# Patient Record
Sex: FEMALE | Race: WHITE | NOT HISPANIC OR LATINO | Employment: PART TIME | URBAN - METROPOLITAN AREA
[De-identification: names, ages, dates, MRNs, and addresses within clinical notes are randomized per-mention and may not be internally consistent; named-entity substitution may affect disease eponyms.]

---

## 2017-01-27 ENCOUNTER — RESULTS ENCOUNTER (OUTPATIENT)
Dept: FAMILY MEDICINE CLINIC | Facility: CLINIC | Age: 52
End: 2017-01-27

## 2017-01-27 DIAGNOSIS — E78.00 HYPERCHOLESTEROLEMIA: ICD-10-CM

## 2017-01-27 DIAGNOSIS — E55.9 VITAMIN D DEFICIENCY: ICD-10-CM

## 2017-02-22 ENCOUNTER — OFFICE VISIT (OUTPATIENT)
Dept: FAMILY MEDICINE CLINIC | Facility: CLINIC | Age: 52
End: 2017-02-22

## 2017-02-22 VITALS
SYSTOLIC BLOOD PRESSURE: 110 MMHG | BODY MASS INDEX: 21.71 KG/M2 | HEART RATE: 92 BPM | DIASTOLIC BLOOD PRESSURE: 70 MMHG | HEIGHT: 63 IN | WEIGHT: 122.5 LBS | OXYGEN SATURATION: 98 % | TEMPERATURE: 98 F

## 2017-02-22 DIAGNOSIS — J01.10 ACUTE NON-RECURRENT FRONTAL SINUSITIS: Primary | ICD-10-CM

## 2017-02-22 LAB
EXPIRATION DATE: NORMAL
INTERNAL CONTROL: NORMAL
Lab: NORMAL
S PYO AG THROAT QL: NEGATIVE

## 2017-02-22 PROCEDURE — 99213 OFFICE O/P EST LOW 20 MIN: CPT | Performed by: FAMILY MEDICINE

## 2017-02-22 PROCEDURE — 87880 STREP A ASSAY W/OPTIC: CPT | Performed by: FAMILY MEDICINE

## 2017-02-22 RX ORDER — AMOXICILLIN 500 MG/1
1000 CAPSULE ORAL 3 TIMES DAILY
Qty: 60 CAPSULE | Refills: 0 | Status: SHIPPED | OUTPATIENT
Start: 2017-02-22

## 2017-02-22 NOTE — PROGRESS NOTES
Subjective   Lindsay Fitzpatrick is a 51 y.o. female.   Chief Complaint   Patient presents with   • Sore Throat   • Headache   • Nasal Congestion       History of Present Illness     #1 sore throat on and off for one month.  Current episode started 4 days ago.  Patient also noticed  white spots on the right side of her throat and some on the left, she has a dry cough and chills for 4 days, subjective fever.  She has sinus pressure and thick, green nasal discharge.  She tried Claritin-D, Nasonex, Aleve.  It helped some, but overall she is getting worse.  No other symptoms associated.    The following portions of the patient's history were reviewed and updated as appropriate: allergies, current medications, past medical history and problem list.    Review of Systems   Constitutional: Positive for chills and fever.   HENT: Positive for congestion, rhinorrhea and sinus pressure.    Respiratory: Positive for cough.          Objective   Wt Readings from Last 3 Encounters:   02/22/17 122 lb 8 oz (55.6 kg)   07/27/16 123 lb (55.8 kg)   01/15/16 124 lb 0.1 oz (56.3 kg)      Vitals:    02/22/17 1117   BP: 110/70   Pulse: 92   Temp: 98 °F (36.7 °C)   SpO2: 98%     Temp Readings from Last 3 Encounters:   02/22/17 98 °F (36.7 °C)   07/27/16 98.4 °F (36.9 °C)   01/15/16 98.2 °F (36.8 °C)     BP Readings from Last 3 Encounters:   02/22/17 110/70   07/27/16 110/70   01/15/16 100/60     Pulse Readings from Last 3 Encounters:   02/22/17 92   07/27/16 97   01/15/16 100       Physical Exam   Constitutional: She is oriented to person, place, and time. She appears well-developed and well-nourished.   HENT:   Head: Normocephalic and atraumatic.   Right Ear: Tympanic membrane, external ear and ear canal normal.   Left Ear: Tympanic membrane, external ear and ear canal normal.   Nose: Mucosal edema and rhinorrhea present.   Mouth/Throat: Mucous membranes are normal. No oropharyngeal exudate, posterior oropharyngeal edema or posterior  oropharyngeal erythema. No tonsillar exudate.   Neck: Neck supple. Carotid bruit is not present.   Cardiovascular: Normal rate, regular rhythm and normal heart sounds.    Pulmonary/Chest: Effort normal and breath sounds normal.   Lymphadenopathy:     She has no cervical adenopathy.   Neurological: She is alert and oriented to person, place, and time.   Skin: Skin is warm, dry and intact.       Assessment/Plan   Lindsay was seen today for sore throat, headache and nasal congestion.    Diagnoses and all orders for this visit:    Acute non-recurrent frontal sinusitis    Other orders  -     amoxicillin (AMOXIL) 500 MG capsule; Take 2 capsules by mouth 3 (Three) Times a Day.        #1 acute sinusitis-will treat with amoxicillin 1 g 3 times a day for 10 days.  Mucinex 1200 mg plus fluids.  Strep test negative.

## 2018-04-04 ENCOUNTER — TRANSCRIBE ORDERS (OUTPATIENT)
Dept: ADMINISTRATIVE | Facility: HOSPITAL | Age: 53
End: 2018-04-04

## 2018-04-04 DIAGNOSIS — Z13.820 ENCOUNTER FOR SCREENING FOR OSTEOPOROSIS: Primary | ICD-10-CM

## 2018-04-11 ENCOUNTER — HOSPITAL ENCOUNTER (OUTPATIENT)
Dept: BONE DENSITY | Facility: HOSPITAL | Age: 53
Discharge: HOME | End: 2018-04-11
Attending: OBSTETRICS & GYNECOLOGY | Admitting: OBSTETRICS & GYNECOLOGY

## 2018-04-11 DIAGNOSIS — Z13.820 ENCOUNTER FOR SCREENING FOR OSTEOPOROSIS: ICD-10-CM

## 2018-04-11 PROCEDURE — 77080 DXA BONE DENSITY AXIAL: CPT

## 2023-06-15 ENCOUNTER — HOSPITAL ENCOUNTER (OUTPATIENT)
Dept: PET IMAGING | Facility: HOSPITAL | Age: 58
Discharge: HOME | End: 2023-06-15
Payer: COMMERCIAL

## 2023-06-15 ENCOUNTER — TRANSCRIBE ORDERS (OUTPATIENT)
Dept: WOMENS IMAGING | Facility: HOSPITAL | Age: 58
End: 2023-06-15
Payer: COMMERCIAL

## 2023-06-15 DIAGNOSIS — M81.0 HIGH RISK FOR FRACTURE DUE TO OSTEOPOROSIS BY DEXA SCAN: Primary | ICD-10-CM

## 2023-06-15 PROCEDURE — 77080 DXA BONE DENSITY AXIAL: CPT | Performed by: RADIOLOGY

## 2023-06-15 PROCEDURE — 77080 DXA BONE DENSITY AXIAL: CPT

## 2025-03-18 ENCOUNTER — OFFICE VISIT (OUTPATIENT)
Dept: CARDIOLOGY | Facility: CLINIC | Age: 60
End: 2025-03-18
Payer: COMMERCIAL

## 2025-03-18 VITALS
HEIGHT: 63 IN | HEART RATE: 82 BPM | WEIGHT: 131.8 LBS | BODY MASS INDEX: 23.35 KG/M2 | SYSTOLIC BLOOD PRESSURE: 100 MMHG | OXYGEN SATURATION: 98 % | DIASTOLIC BLOOD PRESSURE: 66 MMHG

## 2025-03-18 DIAGNOSIS — R00.2 PALPITATIONS: ICD-10-CM

## 2025-03-18 DIAGNOSIS — E78.49 FAMILIAL HYPERLIPIDEMIA, HIGH LDL: Primary | ICD-10-CM

## 2025-03-18 DIAGNOSIS — R06.09 DYSPNEA ON EXERTION: ICD-10-CM

## 2025-03-18 DIAGNOSIS — R42 LIGHTHEADED: ICD-10-CM

## 2025-03-18 DIAGNOSIS — R22.42 LOCALIZED SWELLING OF LEFT LOWER EXTREMITY: ICD-10-CM

## 2025-03-18 PROCEDURE — 99204 OFFICE O/P NEW MOD 45 MIN: CPT | Performed by: STUDENT IN AN ORGANIZED HEALTH CARE EDUCATION/TRAINING PROGRAM

## 2025-03-18 PROCEDURE — 93000 ELECTROCARDIOGRAM COMPLETE: CPT | Performed by: STUDENT IN AN ORGANIZED HEALTH CARE EDUCATION/TRAINING PROGRAM

## 2025-03-18 RX ORDER — BUDESONIDE AND FORMOTEROL FUMARATE DIHYDRATE 160; 4.5 UG/1; UG/1
2 AEROSOL RESPIRATORY (INHALATION)
COMMUNITY
Start: 2025-03-06 | End: 2025-09-02

## 2025-03-18 RX ORDER — METOPROLOL TARTRATE 25 MG/1
200 TABLET, FILM COATED ORAL ONCE
OUTPATIENT
Start: 2025-03-18 | End: 2025-03-18

## 2025-03-18 RX ORDER — METOPROLOL TARTRATE 25 MG/1
100 TABLET, FILM COATED ORAL ONCE
OUTPATIENT
Start: 2025-03-18

## 2025-03-18 RX ORDER — METOPROLOL TARTRATE 25 MG/1
TABLET, FILM COATED ORAL
Qty: 2 TABLET | Refills: 0 | Status: SHIPPED | OUTPATIENT
Start: 2025-03-18

## 2025-03-18 RX ORDER — METOPROLOL TARTRATE 25 MG/1
150 TABLET, FILM COATED ORAL ONCE
OUTPATIENT
Start: 2025-03-18

## 2025-03-18 RX ORDER — METOPROLOL TARTRATE 25 MG/1
50 TABLET, FILM COATED ORAL ONCE
OUTPATIENT
Start: 2025-03-18

## 2025-03-18 RX ORDER — SODIUM CHLORIDE 9 MG/ML
40 INJECTION, SOLUTION INTRAVENOUS AS NEEDED
OUTPATIENT
Start: 2025-03-18

## 2025-03-18 RX ORDER — EVOLOCUMAB 140 MG/ML
140 INJECTION, SOLUTION SUBCUTANEOUS
COMMUNITY
Start: 2025-02-06 | End: 2025-08-05

## 2025-03-18 RX ORDER — ESTRADIOL 0.1 MG/D
1 PATCH TRANSDERMAL WEEKLY
COMMUNITY

## 2025-03-18 RX ORDER — METOPROLOL TARTRATE 50 MG
50 TABLET ORAL
OUTPATIENT
Start: 2025-03-18

## 2025-03-18 RX ORDER — METOPROLOL TARTRATE 25 MG/1
TABLET, FILM COATED ORAL
Qty: 2 TABLET | Refills: 0 | Status: SHIPPED | OUTPATIENT
Start: 2025-03-18 | End: 2025-03-18

## 2025-03-18 RX ORDER — NITROGLYCERIN 0.4 MG/1
0.4 TABLET SUBLINGUAL
OUTPATIENT
Start: 2025-03-18 | End: 2025-03-18

## 2025-03-18 RX ORDER — METOPROLOL TARTRATE 1 MG/ML
5 INJECTION, SOLUTION INTRAVENOUS
OUTPATIENT
Start: 2025-03-18

## 2025-03-18 RX ORDER — SODIUM CHLORIDE 0.9 % (FLUSH) 0.9 %
10 SYRINGE (ML) INJECTION EVERY 12 HOURS SCHEDULED
OUTPATIENT
Start: 2025-03-18

## 2025-03-18 RX ORDER — IVABRADINE 5 MG/1
15 TABLET, FILM COATED ORAL ONCE
OUTPATIENT
Start: 2025-03-18 | End: 2025-03-18

## 2025-03-18 RX ORDER — NITROGLYCERIN 0.4 MG/1
0.8 TABLET SUBLINGUAL
OUTPATIENT
Start: 2025-03-18

## 2025-03-18 RX ORDER — LORATADINE 10 MG/1
20 TABLET ORAL DAILY
COMMUNITY

## 2025-03-18 RX ORDER — SODIUM CHLORIDE 0.9 % (FLUSH) 0.9 %
10 SYRINGE (ML) INJECTION AS NEEDED
OUTPATIENT
Start: 2025-03-18

## 2025-03-18 RX ORDER — CYCLOSPORINE 0.5 MG/ML
1 EMULSION OPHTHALMIC EVERY 12 HOURS
COMMUNITY
Start: 2024-12-12

## 2025-03-18 RX ORDER — ALBUTEROL SULFATE 90 UG/1
2 INHALANT RESPIRATORY (INHALATION) EVERY 6 HOURS PRN
COMMUNITY
Start: 2025-03-06

## 2025-03-18 RX ORDER — TRAZODONE HYDROCHLORIDE 50 MG/1
25 TABLET ORAL
COMMUNITY
Start: 2025-02-13

## 2025-03-18 NOTE — PATIENT INSTRUCTIONS
To investigate your shortness of breath, and rule out a cardiac cause, were going to obtain a CT scan of your heart.  You are going to take the metoprolol tablet the night before and the morning of the scan.  Make sure that you increase your hydration including increasing water intake and Gatorade's the night before and the morning of the scan.  This will help increase your blood pressure.

## 2025-03-18 NOTE — PROGRESS NOTES
[facility]    Subjective:     Encounter Date:03/18/25      Patient ID: Lindsay Fitzpatrick is a 59 y.o. female.    Chief Complaint:   Chief Complaint   Patient presents with    Establish Care     Patient is in the office today to establish care for mixed hyperlipidemia.       History of Present Illness    Lindsay Fitzpatrick is a 59 y.o. lady who presents for further evaluation.    She has a past medical history of tobacco use and quit smoking 7 years ago.  She has had intermittent dyspnea complaints that dates back with these paroxysms of dyspnea to 2018.  She saw cardiologist at that time and a stress echocardiogram was unremarkable.  She has had intermittent complaints of dizziness, left leg swelling, and ringing in her right ear with a whooshing sound and some dizziness when she reaches her arms above her head, and the symptoms have a concern that she presents today for cardiac evaluation.    Her PCP has ordered a carotid duplex and lower extremity ultrasounds and Dopplers which are pending.  She has a history of venous insufficiency in the past requiring vein stripping.    Currently, she continues to have these intermittent dyspnea complaints, which she describes as paroxysms of dyspnea sometimes walking up a flight of stairs, but also intermittently at rest.  She will have some heaviness in her chest when it occurs.  She also has an intermittent palpitation complaint where she feels a prominent heartbeat, but this seems like a separate issue when compared to the intermittent dizziness and dyspnea.    Previous Cardiac Testing:  Holter monitor, February 2025: 48 hours.  Patient triggered events all correlate with sinus rhythm or sinus tachycardia. Sinus rhythm throughout with occasional PVCs at a burden of 0.3%.  No atrial fibrillation or sustained arrhythmias.  No heart block or significant pauses.  All symptoms corresponded to sinus rhythm and sinus tachycardia.     Echo February 2025:  Normal  "LV structure and systolic function.                            The LVEF is 59%.                            No significant valvular abnormalities.                            Normal RV structure and systolic function.                            Estimated normal left- and right-sided filling pressures.   Findings    Lung cancer screening CT February 2025: Aortic calcifications but no evidence of coronary artery calcium.    The following portions of the patient's history were reviewed and updated as appropriate: allergies, current medications, past family history, past medical history, past social history, past surgical history and problem list.    Past Medical History:   Diagnosis Date    Anemia     Asthma     Breast cyst     Depression     Endometriosis     H/O blood clots     Hyperlipidemia     Lupus        Past Surgical History:   Procedure Laterality Date    BREAST CYST EXCISION      HYSTERECTOMY             ECG 12 Lead    Date/Time: 3/18/2025 10:31 AM  Performed by: Shawn Roberts MD    Authorized by: Shawn Roberts MD  Comparison: not compared with previous ECG   Previous ECG: no previous ECG available  Rhythm: sinus rhythm  Rate: normal  Conduction: conduction normal  ST Segments: ST segments normal  T Waves: T waves normal  QRS axis: normal  Other findings: right atrial abnormality    Clinical impression: non-specific ECG  Comments: Borderline right atrial enlargement otherwise normal ECG.             Objective:     Vitals:    03/18/25 0838   BP: 100/66   BP Location: Left arm   Patient Position: Sitting   Cuff Size: Adult   Pulse: 82   SpO2: 98%   Weight: 59.8 kg (131 lb 12.8 oz)   Height: 160 cm (63\")         Constitutional:       Appearance: Healthy appearance. Not in distress.   Neck:      Vascular: JVD normal.   Pulmonary:      Effort: Pulmonary effort is normal.      Breath sounds: Normal breath sounds.   Cardiovascular:      PMI at left midclavicular line. Normal rate. Regular rhythm. Normal S2.       " Murmurs: There is no murmur.      Comments: No significant carotid bruits.  Pulses:     Intact distal pulses.   Edema:     Peripheral edema absent.   Skin:     General: Skin is warm and dry.   Neurological:      General: No focal deficit present.      Mental Status: Alert, oriented to person, place, and time and oriented to person, place and time.   Psychiatric:         Mood and Affect: Mood and affect normal.         Lab Review:       BUN   Date Value Ref Range Status   01/17/2023 13 10 - 20 mg/dL Final     Creatinine   Date Value Ref Range Status   01/17/2023 0.63 0.55 - 1.02 mg/dL Final     Potassium   Date Value Ref Range Status   01/17/2023 5.3 (H) 3.5 - 5.1 mmol/L Final     ALT (SGPT)   Date Value Ref Range Status   01/17/2023 15 10 - 35 U/L Final     AST (SGOT)   Date Value Ref Range Status   01/17/2023 17 10 - 35 U/L Final         Performed        Assessment:          Diagnosis Plan   1. Familial hyperlipidemia, high LDL  CT Angiogram Coronary    CT Angiogram Coronary-Cardiology Interpretation    metoprolol tartrate (LOPRESSOR) tablet 200 mg    metoprolol tartrate (LOPRESSOR) tablet 150 mg    metoprolol tartrate (LOPRESSOR) tablet 100 mg    metoprolol tartrate (LOPRESSOR) tablet 50 mg    metoprolol tartrate (LOPRESSOR) tablet 50 mg    metoprolol tartrate (LOPRESSOR) injection 5 mg    nitroglycerin (NITROSTAT) SL tablet 0.4 mg    nitroglycerin (NITROSTAT) SL tablet 0.8 mg    ivabradine HCl (CORLANOR) tablet 15 mg    No Caffeine or Nicotine 4 Hours Prior to CTA Appointment    Nothing to Eat or Drink 4 Hours Prior to CTA Appointment    Do Not Take Phosphodiasterase Inhibitors in the 72 Hours Prior to Coronary CTA    Obtain Informed Consent - Computed Tomography Angiography of Chest - Angiogram of Coronary Arteries    Vital Signs Upon Arrival    Cardiac Monitoring    Verify NPO Status - Patient to Be NPO at Least 4 Hours Prior to CTA    Notify CT After Administration of metoprolol tartrate (LOPRESSOR) tablet     Notify Provider If Total Metoprolol Given Equals 300mg & Heart Rate Not At Goal    Notify Provider Prior to Administration of Nitroglycerin if Patient SBP <80    POC Creatinine    Insert Peripheral IV    Saline Lock & Maintain IV Access    sodium chloride 0.9 % flush 10 mL    sodium chloride 0.9 % flush 10 mL    sodium chloride 0.9 % infusion 40 mL    Vital Signs - See Instructions    Hold Medication Metformin (Glucophage, Glucophage XR, Fortament, Glumetza);  Metglip (metformin/glipizide);  Glucovance (metformin/glyburide); Avandamet (metformin/rosiglitazone)    Patient May Discharge Home After Procedure Complete (If Stable)    metoprolol tartrate (LOPRESSOR) 25 MG tablet      2. Dyspnea on exertion  CT Angiogram Coronary    CT Angiogram Coronary-Cardiology Interpretation    metoprolol tartrate (LOPRESSOR) tablet 200 mg    metoprolol tartrate (LOPRESSOR) tablet 150 mg    metoprolol tartrate (LOPRESSOR) tablet 100 mg    metoprolol tartrate (LOPRESSOR) tablet 50 mg    metoprolol tartrate (LOPRESSOR) tablet 50 mg    metoprolol tartrate (LOPRESSOR) injection 5 mg    nitroglycerin (NITROSTAT) SL tablet 0.4 mg    nitroglycerin (NITROSTAT) SL tablet 0.8 mg    ivabradine HCl (CORLANOR) tablet 15 mg    No Caffeine or Nicotine 4 Hours Prior to CTA Appointment    Nothing to Eat or Drink 4 Hours Prior to CTA Appointment    Do Not Take Phosphodiasterase Inhibitors in the 72 Hours Prior to Coronary CTA    Obtain Informed Consent - Computed Tomography Angiography of Chest - Angiogram of Coronary Arteries    Vital Signs Upon Arrival    Cardiac Monitoring    Verify NPO Status - Patient to Be NPO at Least 4 Hours Prior to CTA    Notify CT After Administration of metoprolol tartrate (LOPRESSOR) tablet    Notify Provider If Total Metoprolol Given Equals 300mg & Heart Rate Not At Goal    Notify Provider Prior to Administration of Nitroglycerin if Patient SBP <80    POC Creatinine    Insert Peripheral IV    Saline Lock & Maintain IV  Access    sodium chloride 0.9 % flush 10 mL    sodium chloride 0.9 % flush 10 mL    sodium chloride 0.9 % infusion 40 mL    Vital Signs - See Instructions    Hold Medication Metformin (Glucophage, Glucophage XR, Fortament, Glumetza);  Metglip (metformin/glipizide);  Glucovance (metformin/glyburide); Avandamet (metformin/rosiglitazone)    Patient May Discharge Home After Procedure Complete (If Stable)    metoprolol tartrate (LOPRESSOR) 25 MG tablet      3. Palpitations               Plan:         Dyspnea on exertion: Reported with exertion, improves with rest.  She had similar symptoms in 2022. At which time cardiac testing was unremarkable.  She did have a stress echo in 2018 that was normal, however she was only able to complete 3 minutes and 30 seconds of the Truong protocol at that time.  Given decreased exercise capacity, and possible pulmonary component, we will proceed with CCTA  If CCTA is unremarkable, patient can be reassured this is not due to cardiac origin  Premedication with metoprool, lower dose due to BP. Electrolyte fluids encouraged before CT, her BP is borderline she may need ivabradine   Palpitations/intermittent lightheadedness.  Recent Holter monitoring was normal.  Patient triggered events had no arrhythmia correlates.  Echo structure normal.  This is noncardiac.  Dizziness with reaching arms above the head.  Bilateral pulses are normal.  Her PCP has ordered a carotid duplex which I think is reasonable but clinically my suspicion for subclavian steal or vertebrobasilar insufficiency is low  Familial hyperlipidemia: She is on Repatha.  She did not tolerate Crestor due to myalgias.  Per PCP.  LDL of 125.  Perhaps if there is any coronary artery calcium noted on CT imaging we can modify her lipid therapy appropriately.  Intermittent dizziness.  No arrhythmia correlates.  Salt intake was advised.  Left lower extremity swelling.  Her PCP is ordered a lower extremity ultrasound.  Probably venous  insufficiency from my exam today.  No evidence of CHF on exam or echo         RTC as needed after CCTA.  She has had a rather extensive workup for symptoms for cardiovascular origin per above, and so far within normal and reassuring echo and Holter, I am not sure there is a cardiovascular contribution to her symptoms unless they are indicative of cardiac ischemia which case we will see what the CCTA shows.    Sahwn Roberts MD  [facility]  03/18/25  08:34 EDT       Current Outpatient Medications:     albuterol sulfate  (90 Base) MCG/ACT inhaler, Inhale 2 puffs Every 6 (Six) Hours As Needed for Shortness of Air., Disp: , Rfl:     budesonide-formoterol (SYMBICORT) 160-4.5 MCG/ACT inhaler, Inhale 2 puffs 2 (Two) Times a Day., Disp: , Rfl:     Cholecalciferol (VITAMIN D) 2000 UNITS tablet, Take 1 tablet by mouth Daily., Disp: , Rfl:     clonazePAM (KlonoPIN) 0.5 MG tablet, Take  by mouth., Disp: , Rfl:     estradiol (CLIMARA) 0.1 MG/24HR patch, Place 1 patch on the skin as directed by provider 1 (One) Time Per Week., Disp: , Rfl:     hydroxychloroquine (PLAQUENIL) 200 MG tablet, Take  by mouth., Disp: , Rfl:     loratadine (CLARITIN) 10 MG tablet, Take 2 tablets by mouth Daily., Disp: , Rfl:     metoprolol tartrate (LOPRESSOR) 25 MG tablet, Take 25 mg at Bedtime the Night Before Coronary CTA Appointment and In the Morning 1 Hour Prior to scan, Disp: 2 tablet, Rfl: 0    MINIVELLE 0.1 MG/24HR patch, PLACE 1 PATCH ONTO THE SKIN TWICE A WEEK-ALSO BILL CARD 5050-, Disp: , Rfl: 3    Repatha SureClick solution auto-injector SureClick injection, Inject 1 mL under the skin into the appropriate area as directed Every 14 (Fourteen) Days., Disp: , Rfl:     Restasis 0.05 % ophthalmic emulsion, Apply 1 drop to eye(s) as directed by provider Every 12 (Twelve) Hours., Disp: , Rfl:     traZODone (DESYREL) 50 MG tablet, Take 0.5 tablets by mouth every night at bedtime., Disp: , Rfl:     valACYclovir (VALTREX) 500  MG tablet, TAKE 1 TABLET BY MOUTH TWICE DAILY FOR 7 DAYS, Disp: , Rfl: 1    vilazodone (VIIBRYD) 20 MG tablet tablet, Take  by mouth., Disp: , Rfl:     vitamin B-12 (CYANOCOBALAMIN) 1000 MCG tablet, Take 1 tablet by mouth Daily., Disp: , Rfl:     vitamin C (ASCORBIC ACID) 500 MG tablet, Take 1 tablet by mouth Daily., Disp: , Rfl:     amoxicillin (AMOXIL) 500 MG capsule, Take 2 capsules by mouth 3 (Three) Times a Day. (Patient not taking: Reported on 3/18/2025), Disp: 60 capsule, Rfl: 0         Return if symptoms worsen or fail to improve.      Part of this note may be an electronic transcription/translation of spoken language to printed text using the Dragon Dictation System.

## 2025-03-19 ENCOUNTER — TELEPHONE (OUTPATIENT)
Dept: CARDIOLOGY | Facility: HOSPITAL | Age: 60
End: 2025-03-19
Payer: COMMERCIAL

## 2025-03-19 DIAGNOSIS — R06.09 DYSPNEA ON EXERTION: Primary | ICD-10-CM

## 2025-04-01 ENCOUNTER — TELEPHONE (OUTPATIENT)
Dept: CARDIOLOGY | Facility: HOSPITAL | Age: 60
End: 2025-04-01
Payer: COMMERCIAL

## 2025-04-01 NOTE — TELEPHONE ENCOUNTER
Patient returned phone call. Verified that she did receive her Metoprolol 25mg tabs and aware of instructions. Instructed no caffeine 24 hrs/NPO-No smoking 4 hours prior. Can have clear liquids and encouraged. Patient to arrive early to have BMP obtained. To wear comfortable clothing(sports bra preferred) and expect approx 2 hrs stay.-Lourdes PLATT RN

## 2025-04-02 ENCOUNTER — LAB (OUTPATIENT)
Dept: LAB | Facility: HOSPITAL | Age: 60
End: 2025-04-02
Payer: COMMERCIAL

## 2025-04-02 LAB
ANION GAP SERPL CALCULATED.3IONS-SCNC: 11.3 MMOL/L (ref 5–15)
BUN SERPL-MCNC: 11 MG/DL (ref 8–23)
BUN/CREAT SERPL: 16.7 (ref 7–25)
CALCIUM SPEC-SCNC: 9.4 MG/DL (ref 8.6–10.5)
CHLORIDE SERPL-SCNC: 104 MMOL/L (ref 98–107)
CO2 SERPL-SCNC: 25.7 MMOL/L (ref 22–29)
CREAT SERPL-MCNC: 0.66 MG/DL (ref 0.57–1)
EGFRCR SERPLBLD CKD-EPI 2021: 99.3 ML/MIN/1.73
GLUCOSE SERPL-MCNC: 77 MG/DL (ref 65–99)
POTASSIUM SERPL-SCNC: 4.2 MMOL/L (ref 3.5–5.2)
SODIUM SERPL-SCNC: 141 MMOL/L (ref 136–145)

## 2025-04-02 PROCEDURE — 80048 BASIC METABOLIC PNL TOTAL CA: CPT | Performed by: STUDENT IN AN ORGANIZED HEALTH CARE EDUCATION/TRAINING PROGRAM

## 2025-04-03 ENCOUNTER — HOSPITAL ENCOUNTER (OUTPATIENT)
Dept: CARDIOLOGY | Facility: HOSPITAL | Age: 60
Discharge: HOME | End: 2025-04-03
Payer: COMMERCIAL

## 2025-04-03 ENCOUNTER — HOSPITAL ENCOUNTER (OUTPATIENT)
Dept: CT IMAGING | Facility: HOSPITAL | Age: 60
Discharge: HOME | End: 2025-04-03
Payer: COMMERCIAL

## 2025-04-03 VITALS — OXYGEN SATURATION: 97 % | DIASTOLIC BLOOD PRESSURE: 74 MMHG | SYSTOLIC BLOOD PRESSURE: 112 MMHG | HEART RATE: 58 BPM

## 2025-04-03 DIAGNOSIS — R42 LIGHTHEADED: ICD-10-CM

## 2025-04-03 DIAGNOSIS — R06.09 DYSPNEA ON EXERTION: ICD-10-CM

## 2025-04-03 DIAGNOSIS — E78.49 FAMILIAL HYPERLIPIDEMIA, HIGH LDL: ICD-10-CM

## 2025-04-03 DIAGNOSIS — Q21.16 ASD (ATRIAL SEPTAL DEFECT), SINUS VENOSUS DEFECT: Primary | ICD-10-CM

## 2025-04-03 DIAGNOSIS — R00.2 PALPITATIONS: Primary | ICD-10-CM

## 2025-04-03 PROCEDURE — 25510000001 IOPAMIDOL PER 1 ML: Performed by: STUDENT IN AN ORGANIZED HEALTH CARE EDUCATION/TRAINING PROGRAM

## 2025-04-03 PROCEDURE — 75574 CT ANGIO HRT W/3D IMAGE: CPT

## 2025-04-03 PROCEDURE — 36415 COLL VENOUS BLD VENIPUNCTURE: CPT

## 2025-04-03 RX ORDER — SODIUM CHLORIDE 0.9 % (FLUSH) 0.9 %
10 SYRINGE (ML) INJECTION EVERY 12 HOURS SCHEDULED
Status: DISCONTINUED | OUTPATIENT
Start: 2025-04-03 | End: 2025-04-04 | Stop reason: HOSPADM

## 2025-04-03 RX ORDER — IOPAMIDOL 755 MG/ML
100 INJECTION, SOLUTION INTRAVASCULAR
Status: COMPLETED | OUTPATIENT
Start: 2025-04-03 | End: 2025-04-03

## 2025-04-03 RX ORDER — NITROGLYCERIN 0.4 MG/1
0.8 TABLET SUBLINGUAL ONCE
Status: DISCONTINUED | OUTPATIENT
Start: 2025-04-03 | End: 2025-04-04 | Stop reason: HOSPADM

## 2025-04-03 RX ORDER — SODIUM CHLORIDE 0.9 % (FLUSH) 0.9 %
10 SYRINGE (ML) INJECTION AS NEEDED
Status: DISCONTINUED | OUTPATIENT
Start: 2025-04-03 | End: 2025-04-04 | Stop reason: HOSPADM

## 2025-04-03 RX ADMIN — IOPAMIDOL 100 ML: 755 INJECTION, SOLUTION INTRAVENOUS at 08:47

## 2025-04-03 NOTE — PROGRESS NOTES
Patient verified that she took Metoprolol as directed. Explained procedure and possible side effects. No questions. Consent obtained. Left hand IV infiltrated and painful, removed. Ángela CT Tech started a new line. Patient taken to chuckie-Lourdes PLATT RN

## 2025-04-17 ENCOUNTER — RESULTS FOLLOW-UP (OUTPATIENT)
Dept: CARDIOLOGY | Facility: CLINIC | Age: 60
End: 2025-04-17
Payer: COMMERCIAL

## 2025-04-17 ENCOUNTER — HOSPITAL ENCOUNTER (OUTPATIENT)
Dept: CARDIOLOGY | Facility: HOSPITAL | Age: 60
Discharge: HOME | End: 2025-04-17
Admitting: STUDENT IN AN ORGANIZED HEALTH CARE EDUCATION/TRAINING PROGRAM
Payer: COMMERCIAL

## 2025-04-17 VITALS
BODY MASS INDEX: 23.21 KG/M2 | WEIGHT: 131 LBS | SYSTOLIC BLOOD PRESSURE: 122 MMHG | HEIGHT: 63 IN | DIASTOLIC BLOOD PRESSURE: 76 MMHG

## 2025-04-17 DIAGNOSIS — Q21.16 ASD (ATRIAL SEPTAL DEFECT), SINUS VENOSUS DEFECT: ICD-10-CM

## 2025-04-17 LAB
AORTIC ARCH: 2.2 CM
AORTIC DIMENSIONLESS INDEX: 0.77 (DI)
ASCENDING AORTA: 2.6 CM
AV MEAN PRESS GRAD SYS DOP V1V2: 3.6 MMHG
AV VMAX SYS DOP: 123.5 CM/SEC
BH CV ECHO MEAS - ACS: 1.49 CM
BH CV ECHO MEAS - AO MAX PG: 6.1 MMHG
BH CV ECHO MEAS - AO ROOT DIAM: 2.46 CM
BH CV ECHO MEAS - AO V2 VTI: 30.7 CM
BH CV ECHO MEAS - AVA(I,D): 2.08 CM2
BH CV ECHO MEAS - EDV(CUBED): 45.9 ML
BH CV ECHO MEAS - EDV(MOD-SP2): 78 ML
BH CV ECHO MEAS - EDV(MOD-SP4): 78 ML
BH CV ECHO MEAS - EF(MOD-SP2): 56.4 %
BH CV ECHO MEAS - EF(MOD-SP4): 53.8 %
BH CV ECHO MEAS - ESV(CUBED): 17.8 ML
BH CV ECHO MEAS - ESV(MOD-SP2): 34 ML
BH CV ECHO MEAS - ESV(MOD-SP4): 36 ML
BH CV ECHO MEAS - FS: 27 %
BH CV ECHO MEAS - IVS/LVPW: 0.99 CM
BH CV ECHO MEAS - IVSD: 0.71 CM
BH CV ECHO MEAS - LAT PEAK E' VEL: 10 CM/SEC
BH CV ECHO MEAS - LV DIASTOLIC VOL/BSA (35-75): 48.3 CM2
BH CV ECHO MEAS - LV MASS(C)D: 67.4 GRAMS
BH CV ECHO MEAS - LV MAX PG: 4 MMHG
BH CV ECHO MEAS - LV MEAN PG: 2.13 MMHG
BH CV ECHO MEAS - LV SYSTOLIC VOL/BSA (12-30): 22.3 CM2
BH CV ECHO MEAS - LV V1 MAX: 100.1 CM/SEC
BH CV ECHO MEAS - LV V1 VTI: 23.5 CM
BH CV ECHO MEAS - LVIDD: 3.6 CM
BH CV ECHO MEAS - LVIDS: 2.6 CM
BH CV ECHO MEAS - LVOT AREA: 2.7 CM2
BH CV ECHO MEAS - LVOT DIAM: 1.86 CM
BH CV ECHO MEAS - LVPWD: 0.72 CM
BH CV ECHO MEAS - MED PEAK E' VEL: 8.9 CM/SEC
BH CV ECHO MEAS - MV A DUR: 0.09 SEC
BH CV ECHO MEAS - MV A MAX VEL: 63.9 CM/SEC
BH CV ECHO MEAS - MV DEC SLOPE: 456.8 CM/SEC2
BH CV ECHO MEAS - MV DEC TIME: 0.21 SEC
BH CV ECHO MEAS - MV E MAX VEL: 90.4 CM/SEC
BH CV ECHO MEAS - MV E/A: 1.42
BH CV ECHO MEAS - MV MAX PG: 5.7 MMHG
BH CV ECHO MEAS - MV MEAN PG: 2.02 MMHG
BH CV ECHO MEAS - MV P1/2T: 82.7 MSEC
BH CV ECHO MEAS - MV V2 VTI: 29 CM
BH CV ECHO MEAS - MVA(P1/2T): 2.7 CM2
BH CV ECHO MEAS - MVA(VTI): 2.2 CM2
BH CV ECHO MEAS - PA ACC TIME: 0.22 SEC
BH CV ECHO MEAS - PA V2 MAX: 78.7 CM/SEC
BH CV ECHO MEAS - PULM A REVS DUR: 0.14 SEC
BH CV ECHO MEAS - PULM A REVS VEL: 39.3 CM/SEC
BH CV ECHO MEAS - PULM DIAS VEL: 72.9 CM/SEC
BH CV ECHO MEAS - PULM S/D: 1.07
BH CV ECHO MEAS - PULM SYS VEL: 78.2 CM/SEC
BH CV ECHO MEAS - QP/QS: 0.65
BH CV ECHO MEAS - RAP SYSTOLE: 3 MMHG
BH CV ECHO MEAS - RV MAX PG: 1.7 MMHG
BH CV ECHO MEAS - RV V1 MAX: 65.1 CM/SEC
BH CV ECHO MEAS - RV V1 VTI: 16.6 CM
BH CV ECHO MEAS - RVOT DIAM: 1.78 CM
BH CV ECHO MEAS - RVSP: 27.8 MMHG
BH CV ECHO MEAS - SUP REN AO DIAM: 1.5 CM
BH CV ECHO MEAS - SV(LVOT): 63.9 ML
BH CV ECHO MEAS - SV(MOD-SP2): 44 ML
BH CV ECHO MEAS - SV(MOD-SP4): 42 ML
BH CV ECHO MEAS - SV(RVOT): 41.4 ML
BH CV ECHO MEAS - SVI(LVOT): 39.6 ML/M2
BH CV ECHO MEAS - SVI(MOD-SP2): 27.2 ML/M2
BH CV ECHO MEAS - SVI(MOD-SP4): 26 ML/M2
BH CV ECHO MEAS - TAPSE (>1.6): 2.42 CM
BH CV ECHO MEAS - TR MAX PG: 24.8 MMHG
BH CV ECHO MEAS - TR MAX VEL: 248.9 CM/SEC
BH CV ECHO MEASUREMENTS AVERAGE E/E' RATIO: 9.57
BH CV ECHO SHUNT ASSESSMENT PERFORMED (HIDDEN SCRIPTING): 1
BH CV XLRA - RV BASE: 2.42 CM
BH CV XLRA - RV LENGTH: 6.1 CM
BH CV XLRA - RV MID: 1.97 CM
BH CV XLRA - TDI S': 8.2 CM/SEC
LEFT ATRIUM VOLUME INDEX: 23.1 ML/M2
LV EF BIPLANE MOD: 55 %
SINUS: 3 CM
STJ: 2.13 CM

## 2025-04-17 PROCEDURE — 93306 TTE W/DOPPLER COMPLETE: CPT

## 2025-04-17 NOTE — PROGRESS NOTES
"Please let patient know that her echo test is normal.  There is a very small amount of a connection between 2 of her heart chambers, which confirms the finding on the CT scan.  Thankfully, there is no evidence at all that this will cause her significant problems in the future.  The chambers of her heart are completely normal, and the amount of \"shunting\" is very small.  This suggests that the finding on the CT scan is almost like a normal variant, with a small connection between 2 of the receiving chambers of blood to the heart, called the atria.  Thank you.  " Please see below, patient states that she took her dogs, ibuprofen 50mg (took only half of a 100mg) (vetprofen). She had her vitamins out and the dogs meds too and accidentally took this. She feels okay, took it about an hour, also called the vet who said she should be okay but advised to call her PCP. Discussed with Ishan GOLDEN, advised that she should be okay, she will continue to monitor for side effects, she was also advised she can go to ER or call poison control if side effects occur, Patient verbalized understanding of information given.

## 2025-04-17 NOTE — PROGRESS NOTES
We should probably do a 1 year follow-up for a follow-up exam, and then probably see as needed after that